# Patient Record
Sex: FEMALE | Race: WHITE | NOT HISPANIC OR LATINO | Employment: UNEMPLOYED | URBAN - METROPOLITAN AREA
[De-identification: names, ages, dates, MRNs, and addresses within clinical notes are randomized per-mention and may not be internally consistent; named-entity substitution may affect disease eponyms.]

---

## 2022-07-05 ENCOUNTER — OFFICE VISIT (OUTPATIENT)
Dept: URGENT CARE | Facility: CLINIC | Age: 52
End: 2022-07-05
Payer: MEDICAID

## 2022-07-05 VITALS
HEART RATE: 62 BPM | DIASTOLIC BLOOD PRESSURE: 86 MMHG | RESPIRATION RATE: 19 BRPM | BODY MASS INDEX: 36.26 KG/M2 | SYSTOLIC BLOOD PRESSURE: 134 MMHG | HEIGHT: 64 IN | WEIGHT: 212.38 LBS | TEMPERATURE: 98 F | OXYGEN SATURATION: 99 %

## 2022-07-05 DIAGNOSIS — M79.5 FOREIGN BODY (FB) IN SOFT TISSUE: Primary | ICD-10-CM

## 2022-07-05 PROCEDURE — 99203 PR OFFICE/OUTPT VISIT, NEW, LEVL III, 30-44 MIN: ICD-10-PCS | Mod: S$PBB,,, | Performed by: NURSE PRACTITIONER

## 2022-07-05 PROCEDURE — 99203 OFFICE O/P NEW LOW 30 MIN: CPT | Mod: S$PBB,,, | Performed by: NURSE PRACTITIONER

## 2022-07-05 RX ORDER — MUPIROCIN 20 MG/G
OINTMENT TOPICAL
Qty: 22 G | Refills: 1 | Status: SHIPPED | OUTPATIENT
Start: 2022-07-05

## 2022-07-05 NOTE — PROGRESS NOTES
"Subjective:      Patient ID: Meera Ku is a 51 y.o. female.    Chief Complaint: Puncture Wound (Glass in right heel) and Referral     51-year-old female presents to the clinic reporting stepping on a broken glasses at home. Patient state it is her right heel. Try to take it out but she thinks as a piece  Still stuck. Denies any fever or nausea vomiting or diarrhea. Patient requesting PCP referral established. Declined tetanus due to health risks.    Review of Systems   Integumentary:  Positive for wound.   All other systems reviewed and are negative.      /86   Pulse 62   Temp 98.3 °F (36.8 °C) (Oral)   Resp 19   Ht 5' 4" (1.626 m)   Wt 96.3 kg (212 lb 6.4 oz)   SpO2 99%   BMI 36.46 kg/m²      Current Outpatient Medications:     mupirocin (BACTROBAN) 2 % ointment, Apply to affected area 3 times daily For 5 days., Disp: 22 g, Rfl: 1    Objective:     Physical Exam  Constitutional:       General: She is not in acute distress.     Appearance: Normal appearance. She is not ill-appearing or toxic-appearing.   HENT:      Head: Normocephalic and atraumatic.   Cardiovascular:      Rate and Rhythm: Normal rate and regular rhythm.      Pulses: Normal pulses.      Heart sounds: Normal heart sounds.   Pulmonary:      Effort: Pulmonary effort is normal.      Breath sounds: Normal breath sounds. No wheezing or rhonchi.   Musculoskeletal:         General: Normal range of motion.   Skin:     General: Skin is warm.      Capillary Refill: Capillary refill takes less than 2 seconds.      Comments: Glass noted to right heel. Callus formed. Slightly tender to touch.  no erythema.   Neurological:      General: No focal deficit present.      Mental Status: She is alert and oriented to person, place, and time.   Psychiatric:         Mood and Affect: Mood normal.         Behavior: Behavior normal.         Thought Content: Thought content normal.         Judgment: Judgment normal.      procedure: patient given verbal and " written consent for foreign body  removal from right heel. Time out taken. Area cleaned with chlorhexidine,  Either chloride  Spray topical for anesthesia, #11. Blade again a forceps used to remove small piece of glass. Patient tolerated procedure well with minimal discomfort,  No bleeding. Covered with Band-Aid. Discussed wound care and Bactroban application. Patient verbalized understanding. Done.  Assessment:     Problem List Items Addressed This Visit    None     Visit Diagnoses     Foreign body (FB) in soft tissue    -  Primary    Relevant Medications    mupirocin (BACTROBAN) 2 % ointment    Other Relevant Orders    Ambulatory referral/consult to Family Practice          Plan:   Discussed physical exam findings with patient, foreign body from soft tissue of right heel removed. Care discussed.    Discussed medication  prescribed with patient Rx topical mupirocin ointment t.i.d. for 5 days.   PCP referral initiated.   instructed patient to come back to the clinic or go to nearest emergency room department if symptoms worsens or no improvement or for any other reason   questions elicited and answered  Patient verbalized understanding of discharge instructions, verbalizes understanding of medication prescribed , verbalizes understanding to read discharge instructions    Foreign body (FB) in soft tissue  -     mupirocin (BACTROBAN) 2 % ointment; Apply to affected area 3 times daily For 5 days.  Dispense: 22 g; Refill: 1  -     Ambulatory referral/consult to Family Practice         Recent Lab Results     None                  This note was created with the assistance of a voice recognition software or  phone dictation. There may be transcription errors as a result of using this technology, however minimal effort has been made to assure accuracy of transcription, but any obvious errors or omissions should be clarified with the author of the document.

## 2022-08-17 ENCOUNTER — OFFICE VISIT (OUTPATIENT)
Dept: INTERNAL MEDICINE | Facility: CLINIC | Age: 52
End: 2022-08-17
Payer: MEDICAID

## 2022-08-17 VITALS
HEART RATE: 99 BPM | WEIGHT: 218.5 LBS | HEIGHT: 64 IN | BODY MASS INDEX: 37.3 KG/M2 | RESPIRATION RATE: 18 BRPM | SYSTOLIC BLOOD PRESSURE: 130 MMHG | DIASTOLIC BLOOD PRESSURE: 84 MMHG

## 2022-08-17 DIAGNOSIS — G43.819 OTHER MIGRAINE WITHOUT STATUS MIGRAINOSUS, INTRACTABLE: Primary | ICD-10-CM

## 2022-08-17 DIAGNOSIS — D32.9 MENINGIOMA: ICD-10-CM

## 2022-08-17 DIAGNOSIS — D12.6 ADENOMA OF COLON: ICD-10-CM

## 2022-08-17 LAB
APPEARANCE UR: CLEAR
BACTERIA #/AREA URNS AUTO: ABNORMAL /HPF
BILIRUB UR QL STRIP.AUTO: NEGATIVE MG/DL
COLOR UR AUTO: ABNORMAL
GLUCOSE UR QL STRIP.AUTO: NORMAL MG/DL
HYALINE CASTS #/AREA URNS LPF: ABNORMAL /LPF
KETONES UR QL STRIP.AUTO: NEGATIVE MG/DL
LEUKOCYTE ESTERASE UR QL STRIP.AUTO: NEGATIVE UNIT/L
MUCOUS THREADS URNS QL MICRO: ABNORMAL /LPF
NITRITE UR QL STRIP.AUTO: NEGATIVE
PH UR STRIP.AUTO: 5.5 [PH]
PROT UR QL STRIP.AUTO: NEGATIVE MG/DL
RBC #/AREA URNS AUTO: ABNORMAL /HPF
RBC UR QL AUTO: NEGATIVE UNIT/L
SP GR UR STRIP.AUTO: 1.01
SQUAMOUS #/AREA URNS LPF: ABNORMAL /HPF
UROBILINOGEN UR STRIP-ACNC: NORMAL MG/DL
WBC #/AREA URNS AUTO: ABNORMAL /HPF

## 2022-08-17 PROCEDURE — 99214 OFFICE O/P EST MOD 30 MIN: CPT | Mod: PBBFAC

## 2022-08-17 PROCEDURE — 81001 URINALYSIS AUTO W/SCOPE: CPT

## 2022-08-17 RX ORDER — LORATADINE 10 MG/1
10 TABLET ORAL
COMMUNITY
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
## 2022-08-18 ENCOUNTER — OUTPATIENT CASE MANAGEMENT (OUTPATIENT)
Dept: ADMINISTRATIVE | Facility: OTHER | Age: 52
End: 2022-08-18
Payer: MEDICAID

## 2022-08-18 NOTE — LETTER
August 19, 2022    Meera Ku  [address] [place] [id number]             [facility]   Dear Meera Ku,    I work with [facility]'s Outpatient Case Management Department. I have been unsuccessful at reaching you to follow up to see how you have been doing. Please call me back at your earliest convenience to discuss your health care needs.    I can be reached at [phone] from 7:30 AM to 4:30 PM  Monday through Thursday and 7:30AM to 12:00PM on Friday. [facility] On [facility] is a program offered through [facility] where a nurse is available 24/7 to answer questions or provide medical advice. Their number is [phone].      Kind Regards,    Deanne Landis RN  Outpatient Case Management

## 2022-08-18 NOTE — PROGRESS NOTES
Outpatient Care Management  Patient Not Qualified    Patient: Meera Ku  MRN:  [id number]  Date of Service:  8/29/2022  Completed by:  Deanne Landis RN    Chief Complaint   Patient presents with    OPCM Chart Review     8-    OPCM Enrollment Call     8-    OPCM RN First Assessment Attempt     8-    OPCM RN Second Assessment Attempt     8-    OPCM RN Third Assessment Attempt     8-29-22       Patient Summary     Program:  OPCM High Risk  Qualification Status:  No  Reason Not Qualified:  Unable to reach

## 2022-08-30 ENCOUNTER — PATIENT MESSAGE (OUTPATIENT)
Dept: GASTROENTEROLOGY | Facility: CLINIC | Age: 52
End: 2022-08-30
Payer: MEDICAID

## 2022-08-30 ENCOUNTER — TELEPHONE (OUTPATIENT)
Dept: RADIOLOGY | Facility: HOSPITAL | Age: 52
End: 2022-08-30

## 2022-08-30 NOTE — TELEPHONE ENCOUNTER
----- Message from RT Sridevi sent at 8/18/2022  9:42 AM CDT -----  Regarding: Diagnostic Mammogram/Breast US Needed  This patient was scheduled for screening mammogram and states she is having right breast palps, with a new one against her chest wall in the last few weeks. She has a history of left BRCA 2013 with lumpectomy and radiation. I requested her prior imaging done at [facility].     The mammogram order was changed to diagnostic and the [facility] nurse navigator, Alina Qureshi, will schedule this patient to be seen by the [facility] breast radiologist.     Thanks

## 2022-08-30 NOTE — PROGRESS NOTES
Diagnostic Mammogram/Breast US Needed  Received: 1 week ago  Britt Youssef, RT  Houston Collins MD  Cc: Dianne Qureshi RN  Caller: Unspecified (1 week ago)  This patient was scheduled for screening mammogram and states she is having right breast palps, with a new one against her chest wall in the last few weeks. She has a history of left BRCA 2013 with lumpectomy and radiation. I requested her prior imaging done at [facility].     The mammogram order was changed to diagnostic and the [facility] nurse navigator, Alina Qureshi, will schedule this patient to be seen by the [facility] breast radiologist.     Thanks    n/a

## 2022-09-02 ENCOUNTER — PATIENT MESSAGE (OUTPATIENT)
Dept: GASTROENTEROLOGY | Facility: CLINIC | Age: 52
End: 2022-09-02
Payer: MEDICAID

## 2022-09-05 ENCOUNTER — PATIENT MESSAGE (OUTPATIENT)
Dept: GASTROENTEROLOGY | Facility: CLINIC | Age: 52
End: 2022-09-05
Payer: MEDICAID

## 2022-09-06 ENCOUNTER — PATIENT MESSAGE (OUTPATIENT)
Dept: GASTROENTEROLOGY | Facility: CLINIC | Age: 52
End: 2022-09-06
Payer: MEDICAID

## 2022-10-12 ENCOUNTER — PATIENT MESSAGE (OUTPATIENT)
Dept: GASTROENTEROLOGY | Facility: CLINIC | Age: 52
End: 2022-10-12
Payer: MEDICAID

## 2022-10-12 DIAGNOSIS — Z00.00 HEALTHCARE MAINTENANCE: Primary | ICD-10-CM

## 2022-10-17 DIAGNOSIS — K52.9 COLITIS: Primary | ICD-10-CM

## 2022-11-09 NOTE — PROGRESS NOTES
Pt was scheduled for contrast mammogram and breast ultrasound  to be done on 10/12/22.  Pt canceled this appt on the AM of 10/12 due to illness.  I called pt to discuss cancellation  and reschedule the tlou.  I gave her the next available appt which was 11/28/22.  She was upset about the appointment being so far out.      Pt called today to inform me that she will be getting her mammogram done in [place] because she did not want to wait until 11/28. I cancelled the appointment on 11/28 as requested per patient.

## 2022-12-01 ENCOUNTER — PATIENT MESSAGE (OUTPATIENT)
Dept: GASTROENTEROLOGY | Facility: CLINIC | Age: 52
End: 2022-12-01
Payer: MEDICAID

## 2023-01-25 DIAGNOSIS — Z13.79 GENETIC TESTING: ICD-10-CM

## 2023-01-25 DIAGNOSIS — D42.9 MENINGIOMA, MULTIPLE: Primary | ICD-10-CM

## 2023-01-25 DIAGNOSIS — R22.9 SUBCUTANEOUS NODULES: ICD-10-CM

## 2023-03-03 DIAGNOSIS — Z85.3 HX OF BREAST CANCER: Primary | ICD-10-CM

## 2023-03-09 PROBLEM — D05.10 DCIS (DUCTAL CARCINOMA IN SITU): Status: ACTIVE | Noted: 2023-03-09
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
## 2023-03-10 ENCOUNTER — OFFICE VISIT (OUTPATIENT)
Dept: HEMATOLOGY/ONCOLOGY | Facility: CLINIC | Age: 53
End: 2023-03-10
Payer: MEDICAID

## 2023-03-10 VITALS
OXYGEN SATURATION: 96 % | HEART RATE: 92 BPM | BODY MASS INDEX: 36.25 KG/M2 | RESPIRATION RATE: 18 BRPM | WEIGHT: 212.31 LBS | DIASTOLIC BLOOD PRESSURE: 92 MMHG | TEMPERATURE: 99 F | SYSTOLIC BLOOD PRESSURE: 150 MMHG | HEIGHT: 64 IN

## 2023-03-10 DIAGNOSIS — D32.9 MENINGIOMA: Primary | ICD-10-CM

## 2023-03-10 DIAGNOSIS — D05.12 DUCTAL CARCINOMA IN SITU (DCIS) OF LEFT BREAST: ICD-10-CM

## 2023-03-10 DIAGNOSIS — Z85.3 HX OF BREAST CANCER: ICD-10-CM

## 2023-03-10 PROCEDURE — 3080F DIAST BP >= 90 MM HG: CPT | Mod: CPTII,,, | Performed by: INTERNAL MEDICINE

## 2023-03-10 PROCEDURE — 3077F SYST BP >= 140 MM HG: CPT | Mod: CPTII,,, | Performed by: INTERNAL MEDICINE

## 2023-03-10 PROCEDURE — 3077F PR MOST RECENT SYSTOLIC BLOOD PRESSURE >= 140 MM HG: ICD-10-PCS | Mod: CPTII,,, | Performed by: INTERNAL MEDICINE

## 2023-03-10 PROCEDURE — 99204 PR OFFICE/OUTPT VISIT, NEW, LEVL IV, 45-59 MIN: ICD-10-PCS | Mod: ,,, | Performed by: INTERNAL MEDICINE

## 2023-03-10 PROCEDURE — 3008F BODY MASS INDEX DOCD: CPT | Mod: CPTII,,, | Performed by: INTERNAL MEDICINE

## 2023-03-10 PROCEDURE — 3008F PR BODY MASS INDEX (BMI) DOCUMENTED: ICD-10-PCS | Mod: CPTII,,, | Performed by: INTERNAL MEDICINE

## 2023-03-10 PROCEDURE — 3080F PR MOST RECENT DIASTOLIC BLOOD PRESSURE >= 90 MM HG: ICD-10-PCS | Mod: CPTII,,, | Performed by: INTERNAL MEDICINE

## 2023-03-10 PROCEDURE — 1159F MED LIST DOCD IN RCRD: CPT | Mod: CPTII,,, | Performed by: INTERNAL MEDICINE

## 2023-03-10 PROCEDURE — 99204 OFFICE O/P NEW MOD 45 MIN: CPT | Mod: ,,, | Performed by: INTERNAL MEDICINE

## 2023-03-10 PROCEDURE — 1159F PR MEDICATION LIST DOCUMENTED IN MEDICAL RECORD: ICD-10-PCS | Mod: CPTII,,, | Performed by: INTERNAL MEDICINE

## 2023-03-10 RX ORDER — UBROGEPANT 50 MG/1
1 TABLET ORAL DAILY PRN
COMMUNITY
Start: 2023-02-01

## 2023-03-14 ENCOUNTER — TELEPHONE (OUTPATIENT)
Dept: NEUROSURGERY | Facility: CLINIC | Age: 53
End: 2023-03-14
Payer: MEDICAID

## 2023-03-14 NOTE — TELEPHONE ENCOUNTER
Spoke with patient. Advised of below. Scheduled her with Leora on AA day for 5/3/23 at 10:30. I did put her on the wait list per her request but did advise that It may not work out depending on if AA is here. She verbalized understanding.

## 2023-03-14 NOTE — TELEPHONE ENCOUNTER
"----- Message from Shagufta Silverio MA sent at 3/14/2023  2:59 PM CDT -----  Regarding: REFERRAL PROCESS- meningioma  This patient is being referred to Dr. Mclain by Dr. Trinh for meningioma. By reading the note and speaking with the patient, this is a very complicated case. From reading Dr. Trinh's note, " Patient has history of meningioma since 2014 and has been following with neurosurgeon who did not recommend surgical resection at this time. Patient had been followed with surveillance MRI at 6 months with last MRI done in 10/2022.". I spoke with patient to inquire about her meningioma. She told me that she now has 3 of them that were found and states they are calcified. All of the reports we have in Media are from [place] in 2019. I asked her if she had any recent imaging done. She said she had MRI's at [facility] which is where she found her 3rd meningioma. She states she does have the discs/reports from [facility]. I do see she lives a little far so I asked if she could mail it to us. She states she is currently living in an , has no money and does not have a stamp to mail. I then inquired about e-mail. She does not have access to Internet. I am unable to get a release form from her to obtain any imaging... She did see Dr. Jose Bender, neurosurgeon in [place] back in 2019. He found her first meningioma. She states from the reports from [facility], there are 3 calcified meningiomas but there is no evidence of active meningioma. She does see a neuro-oncologist, Dr. Baldwin (OV note in chart) and presented him this report. She states he disagreed with the reading and advised her that she does have 3 meningiomas present. Im not really sure where to go from here.. please advise. I tried to type as much info as I could to make it easier on you    "

## 2023-03-14 NOTE — TELEPHONE ENCOUNTER
Spoke with patient. Inquired about referral about her meningioma. She states she now has 3 of them and they are calcified. Its looking like all her records are coming from [place]. I asked her if she had any recent imaging done. She stated that she had some done at [facility] center. States those scans is what showed her 3rd meningioma. In 2019, she did see Dr. Jose Bender, neurosurgeon who initially found the meningioma. He then referred her to hem/occ for different testing. She has recently seen Dr. Baldwin, a neuro-ocologist, at [facility]. I inquired about the recent imaging at [facility]. She states that she does have all the reports/disc from them. I asked if there was a way she could get them to me. I see she lives in [place] so I inquired about mailing info to me. She states she has no job, no income and doesn't even have a stamp to mail it to me. I then inquired about e-mail. She states she is currently living in an  and has no Internet. The only way she could get these images/reports to me, is when she comes into the office for her visit. She did tell me that the report from [facility] showed 3 calcified meningiomas but did not show any evidence of meningioma. She got with Dr. Baldwin regarding these reports and he disagreed with the reading. He stated that there IS evidence of meningioma. I told her I will get with Leora to see how she would like to proceed with this since we are unable to get any imaging/release forms on her. Advised I will be in touch with her.

## 2023-03-16 DIAGNOSIS — R22.9 SUBCUTANEOUS NODULES: Primary | ICD-10-CM

## 2023-03-16 DIAGNOSIS — D05.12 DUCTAL CARCINOMA IN SITU (DCIS) OF LEFT BREAST: ICD-10-CM

## 2023-03-31 ENCOUNTER — TELEPHONE (OUTPATIENT)
Dept: RADIOLOGY | Facility: HOSPITAL | Age: 53
End: 2023-03-31
Payer: MEDICAID

## 2023-03-31 NOTE — CARE UPDATE
Attempted to schedule patient for breast studies on 3/30/2023 at 5:57 p.m. and again on 3/31/2023 4:24 p.m.. Left message again for call back. We have priors.

## 2023-04-04 ENCOUNTER — TELEPHONE (OUTPATIENT)
Dept: RADIOLOGY | Facility: HOSPITAL | Age: 53
End: 2023-04-04
Payer: MEDICAID

## 2023-04-04 NOTE — CARE UPDATE
After multiple attempts to reach patient we connected-patient moved out of state, wants orders for breast studies cancelled as she will pursue breast studies/exams in [place].